# Patient Record
(demographics unavailable — no encounter records)

---

## 2025-07-23 NOTE — PHYSICAL EXAM
[JVD] : no jugular venous distention  [Normal Breath Sounds] : Normal breath sounds [Normal Rate and Rhythm] : normal rate and rhythm [No Rash or Lesion] : No rash or lesion [Alert] : alert [Oriented to Person] : oriented to person [Oriented to Place] : oriented to place [Calm] : calm [de-identified] : NAD [de-identified] : NCAT [de-identified] : Soft nontender nondistended no masses or scars no hernias no Berry sign [de-identified] : Normal tone mild internal hemorrhoids no fissure small hard stools noted [de-identified] : ALANA

## 2025-07-23 NOTE — CONSULT LETTER
[Dear  ___] : Dear  [unfilled], [Consult Letter:] : I had the pleasure of evaluating your patient, [unfilled]. [Please see my note below.] : Please see my note below. [Consult Closing:] : Thank you very much for allowing me to participate in the care of this patient.  If you have any questions, please do not hesitate to contact me. [Sincerely,] : Sincerely, [FreeTextEntry3] : Kb Talbert MD FACS [DrNavarro  ___] : Dr. CORDOVA

## 2025-07-23 NOTE — DATA REVIEWED
[FreeTextEntry1] : I personally reviewed CT and ultrasound demonstrating cholelithiasis.  I reviewed her labs.  Hemoglobin A1c is 6.8 and all her other labs are normal.

## 2025-07-23 NOTE — REASON FOR VISIT
[Consultation] : a consultation visit [Family Member] : family member [FreeTextEntry1] : Gallstones and anal pain

## 2025-07-23 NOTE — HISTORY OF PRESENT ILLNESS
[de-identified] : This is a pleasant relatively healthy 85-year-old female who was in the hospital in December and was diagnosed with cholelithiasis.  She is referred by primary care and posthospitalization for surgical evaluation.  She had at that time she did have abdominal pain but she does not have abdominal pain at this time.  She suffers occasionally from constipation which is a constant problem for her but she denies rectal bleeding.  She has occasional itching burning sensation which is likely hemorrhoids.  She had a colonoscopy several years ago with no specific concerning findings.  She has no complaints at this time except for the perianal burning.

## 2025-07-23 NOTE — PLAN
[FreeTextEntry1] : The patient is asymptomatic cholelithiasis and no abdominal pain.  There is no obvious indication for surgery at this time.  She does have some mild perianal discomfort and she can continue to use the creams as directed.  I recommended to use a stool softener or MiraLAX at least daily.  All their questions were answered.  Thank you very much.